# Patient Record
Sex: MALE | Race: WHITE | Employment: FULL TIME | URBAN - METROPOLITAN AREA
[De-identification: names, ages, dates, MRNs, and addresses within clinical notes are randomized per-mention and may not be internally consistent; named-entity substitution may affect disease eponyms.]

---

## 2020-02-12 ENCOUNTER — HOSPITAL ENCOUNTER (OUTPATIENT)
Age: 40
Setting detail: SPECIMEN
Discharge: HOME | End: 2020-02-12
Payer: MEDICAID

## 2020-02-13 LAB
ABSOLUTE EOS #: 0.2 K/UL (ref 0–0.44)
ABSOLUTE IMMATURE GRANULOCYTE: <0.03 K/UL (ref 0–0.3)
ABSOLUTE LYMPH #: 1.58 K/UL (ref 1.1–3.7)
ABSOLUTE MONO #: 0.5 K/UL (ref 0.1–1.2)
ALBUMIN SERPL-MCNC: 3.8 G/DL (ref 3.5–5.2)
ALBUMIN/GLOBULIN RATIO: 1 (ref 1–2.5)
ALP BLD-CCNC: 116 U/L (ref 40–129)
ALT SERPL-CCNC: 45 U/L (ref 5–41)
ANION GAP SERPL CALCULATED.3IONS-SCNC: 16 MMOL/L (ref 9–17)
AST SERPL-CCNC: 24 U/L
BASOPHILS # BLD: 1 % (ref 0–2)
BASOPHILS ABSOLUTE: 0.05 K/UL (ref 0–0.2)
BILIRUB SERPL-MCNC: 0.17 MG/DL (ref 0.3–1.2)
BUN BLDV-MCNC: 28 MG/DL (ref 6–20)
BUN/CREAT BLD: ABNORMAL (ref 9–20)
CALCIUM SERPL-MCNC: 9.7 MG/DL (ref 8.6–10.4)
CHLORIDE BLD-SCNC: 104 MMOL/L (ref 98–107)
CHOLESTEROL, FASTING: 233 MG/DL
CHOLESTEROL/HDL RATIO: 7.3
CO2: 23 MMOL/L (ref 20–31)
CREAT SERPL-MCNC: 0.91 MG/DL (ref 0.7–1.2)
DIFFERENTIAL TYPE: NORMAL
EOSINOPHILS RELATIVE PERCENT: 3 % (ref 1–4)
GFR AFRICAN AMERICAN: >60 ML/MIN
GFR NON-AFRICAN AMERICAN: >60 ML/MIN
GFR SERPL CREATININE-BSD FRML MDRD: ABNORMAL ML/MIN/{1.73_M2}
GFR SERPL CREATININE-BSD FRML MDRD: ABNORMAL ML/MIN/{1.73_M2}
GLUCOSE BLD-MCNC: 87 MG/DL (ref 70–99)
HCT VFR BLD CALC: 41 % (ref 40.7–50.3)
HDLC SERPL-MCNC: 32 MG/DL
HEMOGLOBIN: 13.1 G/DL (ref 13–17)
IMMATURE GRANULOCYTES: 0 %
LDL CHOLESTEROL: 128 MG/DL (ref 0–130)
LYMPHOCYTES # BLD: 24 % (ref 24–43)
MCH RBC QN AUTO: 28.5 PG (ref 25.2–33.5)
MCHC RBC AUTO-ENTMCNC: 32 G/DL (ref 28.4–34.8)
MCV RBC AUTO: 89.1 FL (ref 82.6–102.9)
MONOCYTES # BLD: 8 % (ref 3–12)
NRBC AUTOMATED: 0 PER 100 WBC
PDW BLD-RTO: 13 % (ref 11.8–14.4)
PLATELET # BLD: 295 K/UL (ref 138–453)
PLATELET ESTIMATE: NORMAL
PMV BLD AUTO: 10.3 FL (ref 8.1–13.5)
POTASSIUM SERPL-SCNC: 4.4 MMOL/L (ref 3.7–5.3)
RBC # BLD: 4.6 M/UL (ref 4.21–5.77)
RBC # BLD: NORMAL 10*6/UL
SEG NEUTROPHILS: 64 % (ref 36–65)
SEGMENTED NEUTROPHILS ABSOLUTE COUNT: 4.18 K/UL (ref 1.5–8.1)
SODIUM BLD-SCNC: 143 MMOL/L (ref 135–144)
TOTAL PROTEIN: 7.7 G/DL (ref 6.4–8.3)
TRIGLYCERIDE, FASTING: 365 MG/DL
TSH SERPL DL<=0.05 MIU/L-ACNC: 2.27 MIU/L (ref 0.3–5)
VITAMIN D 25-HYDROXY: 14.2 NG/ML (ref 30–100)
VLDLC SERPL CALC-MCNC: ABNORMAL MG/DL (ref 1–30)
WBC # BLD: 6.5 K/UL (ref 3.5–11.3)
WBC # BLD: NORMAL 10*3/UL

## 2020-03-05 ENCOUNTER — HOSPITAL ENCOUNTER (OUTPATIENT)
Age: 40
Discharge: HOME | End: 2020-03-05
Payer: MEDICAID

## 2020-03-10 ENCOUNTER — HOSPITAL ENCOUNTER (OUTPATIENT)
Age: 40
Discharge: HOME | End: 2020-03-10
Payer: MEDICAID

## 2020-03-10 ENCOUNTER — HOSPITAL ENCOUNTER (OUTPATIENT)
Dept: ULTRASOUND IMAGING | Age: 40
Discharge: HOME | End: 2020-03-12
Payer: MEDICAID

## 2020-03-10 LAB
C DIFF AG + TOXIN: ABNORMAL
SPECIMEN DESCRIPTION: ABNORMAL

## 2020-03-10 PROCEDURE — 76700 US EXAM ABDOM COMPLETE: CPT

## 2020-03-10 PROCEDURE — 87506 IADNA-DNA/RNA PROBE TQ 6-11: CPT

## 2020-03-10 PROCEDURE — 87493 C DIFF AMPLIFIED PROBE: CPT

## 2020-03-10 PROCEDURE — 87324 CLOSTRIDIUM AG IA: CPT

## 2020-03-10 PROCEDURE — 87449 NOS EACH ORGANISM AG IA: CPT

## 2020-03-11 LAB
C DIFFICILE TOXINS, PCR: NORMAL
CAMPYLOBACTER PCR: NORMAL
E COLI ENTEROTOXIGENIC PCR: NORMAL
PLESIOMONAS SHIGELLOIDES PCR: NORMAL
SALMONELLA PCR: NORMAL
SHIGATOXIN GENE PCR: NORMAL
SHIGELLA SP PCR: NORMAL
SPECIMEN DESCRIPTION: NORMAL
SPECIMEN DESCRIPTION: NORMAL
VIBRIO PCR: NORMAL
YERSINIA ENTEROCOLITICA PCR: NORMAL

## 2020-07-28 ENCOUNTER — OFFICE VISIT (OUTPATIENT)
Dept: GASTROENTEROLOGY | Age: 40
End: 2020-07-28
Payer: MEDICAID

## 2020-07-28 ENCOUNTER — TELEPHONE (OUTPATIENT)
Dept: GASTROENTEROLOGY | Age: 40
End: 2020-07-28

## 2020-07-28 VITALS
BODY MASS INDEX: 30.2 KG/M2 | HEIGHT: 66 IN | HEART RATE: 100 BPM | RESPIRATION RATE: 18 BRPM | DIASTOLIC BLOOD PRESSURE: 70 MMHG | SYSTOLIC BLOOD PRESSURE: 109 MMHG | WEIGHT: 187.9 LBS | TEMPERATURE: 97.8 F

## 2020-07-28 PROCEDURE — 1036F TOBACCO NON-USER: CPT | Performed by: INTERNAL MEDICINE

## 2020-07-28 PROCEDURE — G8417 CALC BMI ABV UP PARAM F/U: HCPCS | Performed by: INTERNAL MEDICINE

## 2020-07-28 PROCEDURE — G8427 DOCREV CUR MEDS BY ELIG CLIN: HCPCS | Performed by: INTERNAL MEDICINE

## 2020-07-28 PROCEDURE — 99204 OFFICE O/P NEW MOD 45 MIN: CPT | Performed by: INTERNAL MEDICINE

## 2020-07-28 RX ORDER — LISINOPRIL 5 MG/1
5 TABLET ORAL DAILY
COMMUNITY

## 2020-07-28 RX ORDER — SODIUM, POTASSIUM,MAG SULFATES 17.5-3.13G
SOLUTION, RECONSTITUTED, ORAL ORAL
Qty: 2 BOTTLE | Refills: 0
Start: 2020-07-28

## 2020-07-28 RX ORDER — OMEPRAZOLE 20 MG/1
20 CAPSULE, DELAYED RELEASE ORAL DAILY
COMMUNITY

## 2020-07-28 NOTE — PROGRESS NOTES
Shereen Conway is a 38 y.o. male   YOB: 1980    Blood pressure 109/70, pulse 100, temperature 97.8 °F (36.6 °C), temperature source Temporal, resp. rate 18, height 5' 6\" (1.676 m), weight 187 lb 14.4 oz (85.2 kg). Body mass index is 30.33 kg/m². Mr. Dakotah Arreola is a young man who presents for evaluation regarding diarrhea of a few years duration. He is a vague historian who not infrequently contradicted himself. He states he has been having \"uncontrollable\" diarrhea for the last few years with up to 12-13 stools daily including nocturnal defecation. He says that he gets much worse after taking a Trulicity shot and, if he does not take them, his stools become formed with a frequency of about 4/day. He tells me that he gets other side effects from this medication including lightheadedness, nausea and fatigue but that stopping it is not possible because of his high hemoglobin A1c. He has also taken metformin in the past which also exacerbated his diarrhea in addition to which it caused nausea and abdominal discomfort. He has frequent episodes of nausea and pyrosis with no benefit from PPI treatment although he never took it for more than a few days before deciding that it was unhelpful. He denies melena, hematochezia, hematemesis, dysphagia, odynophagia, weight loss, fever or chills. He has a family history of colon polyps in his mother who had negative genetic testing for hereditary colon cancer which leads me to believe that perhaps she had carcinoma rather than benign polyps. He has diabetes mellitus of 20-21 years duration with known neuropathy and retinopathy. He initially stated that he had seen a gastroenterologist in [facility] who ordered laboratory studies after which he said that might of been an endocrinologist.    According to the chart, Ms. Centeno, his PCP ordered laboratory studies in February and March of this year with the results being white blood count 6500, hemoglobin 13.1, hematocrit 41.0, platelet count 969,347, glucose 87, BUN 28, creatinine 0.91, calcium 9.7, sodium 143, potassium 4.4, chloride 104, bicarbonate 23, alkaline phosphatase 116, ALT 45, AST 24, bilirubin 0.17, albumin 3.8, cholesterol 233, HDL 32, , cholesterol/HDL ratio 7.3, triglyceride 365, TSH 2.27, vitamin D 14.2. C. difficile toxin/antigen was indeterminate but subsequent molecular testing was negative. A GI panel was negative for Campylobacter, Salmonella, Shigella toxin, Shigella, Plesiomonas Shigelloides, vibrio, enterotoxigenic E. coli and Yersinia enterocolitica. The only hemoglobin A1c on the chart is from 2015 at which time was greater than 14. Abdominal ultrasound on 3/10/2020 was unremarkable, status post cholecystectomy.     Past Medical History:   Diagnosis Date    Hypertension     IBS (irritable bowel syndrome)     Type II or unspecified type diabetes mellitus without mention of complication, not stated as uncontrolled         Past Surgical History:   Procedure Laterality Date    APPENDECTOMY      CHOLECYSTECTOMY      HAND SURGERY          Family History   Problem Relation Age of Onset    Breast Cancer Mother     Colon Polyps Mother        Social History     Socioeconomic History    Marital status: Single     Spouse name: Not on file    Number of children: Not on file    Years of education: Not on file    Highest education level: Not on file   Occupational History    Occupation:  none   Social Needs    Financial resource strain: Not on file    Food insecurity     Worry: Not on file     Inability: Not on file   [employer] needs     Medical: Not on file     Non-medical: Not on file   Tobacco Use    Smoking status: Former Smoker    Smokeless tobacco: Never Used   Substance and Sexual Activity    Alcohol use: No     Alcohol/week: 0.0 standard drinks    Drug use: Yes     Comment: marjuana some times    Sexual activity: Yes     Partners: Female   Lifestyle    Physical activity     Days per week: Not on file     Minutes per session: Not on file    Stress: Not on file   Relationships    Social connections     Talks on phone: Not on file     Gets together: Not on file     Attends Sikhism service: Not on file     Active member of club or organization: Not on file     Attends meetings of clubs or organizations: Not on file     Relationship status: Not on file    Intimate partner violence     Fear of current or ex partner: Not on file     Emotionally abused: Not on file     Physically abused: Not on file     Forced sexual activity: Not on file   Other Topics Concern    Not on file   Social History Narrative    Not on file       Outpatient Medications Marked as Taking for the 7/28/20 encounter (Office Visit) with Irene Oconnor MD   Medication Sig Dispense Refill    lisinopril (PRINIVIL;ZESTRIL) 5 MG tablet Take 5 mg by mouth daily      omeprazole (PRILOSEC) 20 MG delayed release capsule Take 20 mg by mouth Daily      Na Sulfate-K Sulfate-Mg Sulf 17.5-3.13-1.6 GM/177ML SOLN Take as directed for colon prep 2 Bottle 0       Allergies as of 07/28/2020    (No Known Allergies)         Review of systems:   General: Completed and, except as mentioned above, was negative or noncontributory  Psychological:  Completed and, except as mentioned above, was negative or noncontributory  Ophthalmic:  Completed and, except as mentioned above, was negative or noncontributory  ENT:  Completed and, except as mentioned above, was negative or noncontributory  Allergy and Immunology:  Completed and, except as mentioned above, was negative or noncontributory  Hematological and Lymphatic:  Completed and, except as mentioned above, was negative or noncontributory  Endocrine: Completed and, except as mentioned above, was negative or noncontributory  Breast:  Completed and, except as mentioned above, was negative or noncontributory  Respiratory:  Completed and, except as mentioned above, was negative or noncontributory  Cardiovascular:  Completed and, except as mentioned above, was negative or noncontributory  Gastrointestinal: See HPI  Genito-Urinary:  Completed and, except as mentioned above, was negative or noncontributory  Musculoskeletal:  Completed and, except as mentioned above, was negative or noncontributory  Neurological:  Completed and, except as mentioned above, was negative or noncontributory  Dermatological:  Completed and, except as mentioned above, was negative or noncontributory      Physical exam:  Constitutional - Well-developed, well-nourished, in no acute distress. Vital signs, height and weight are as documented above. Mental Status /Psychiatric - alert, oriented to person, place, and time, normal mood, behavior, speech, dress, motor activity, and thought processes    Neck - supple, no significant adenopathy, trachea midline, thyroid not enlarged     Respiratory - clear to auscultation, no wheezes, rales or rhonchi, symmetric air entry, no tachypnea, retractions or cyanosis, no evidence of increased respiratory effort     Cardiovascular - normal rate, regular rhythm, normal S1, S2, no murmurs, rubs, clicks or gallops, normal bilateral carotid upstroke without bruits, no JVD     Gastrointestinal - soft, nontender, nondistended, no masses or organomegaly, bowel sounds normal, no hernias noted     Musculoskeletal - no joint tenderness, deformity or swelling, no muscular tenderness noted, normal range of motion     Extremities - peripheral pulses normal, no pedal edema, no clubbing or cyanosis     Skin - normal coloration and turgor, no rashes, no suspicious skin lesions noted    This gentlemen's diarrhea is probably secondary to his Trulicity although we need to rule out microscopic colitis, neoplasm and inflammatory bowel disease. Additionally, he could have diabetic enteropathy given the duration of his disease, poor control and no neuropathy and retinopathy.   His heartburn and nausea

## 2020-07-28 NOTE — TELEPHONE ENCOUNTER
Patient was in office today Colon/EGD scheduled for 10/5/2020 order faxed to surgery prep instructions handed to patient

## 2020-07-28 NOTE — PATIENT INSTRUCTIONS
SURVEY:    You may be receiving a survey from [employer] regarding your visit today. Please complete the survey to enable us to provide the highest quality of care to you and your family. If you cannot score us a very good on any question, please call the office to discuss how we could have made your experience a very good one. Thank you. Patient Education        Irritable Bowel Syndrome: Care Instructions  Your Care Instructions  Irritable bowel syndrome, or IBS, is a problem with the intestines that causes belly pain, bloating, gas, constipation, and diarrhea. The cause of IBS is not well known. IBS can last for many years, but it does not get worse over time or lead to serious disease. Most people can control their symptoms by changing their diet and reducing stress. Follow-up care is a key part of your treatment and safety. Be sure to make and go to all appointments, and call your doctor if you are having problems. It's also a good idea to know your test results and keep a list of the medicines you take. How can you care for yourself at home? · For constipation:  ? Include fruits, vegetables, beans, and whole grains in your diet each day. These foods are high in fiber. ? Drink plenty of fluids. If you have kidney, heart, or liver disease and have to limit fluids, talk with your doctor before you increase the amount of fluids you drink. ? Take a fiber supplement, such as Citrucel or Metamucil, every day if needed. Read and follow all instructions on the label. ? Schedule time each day for a bowel movement. Having a daily routine may help. Take your time and do not strain when having a bowel movement. · If you often have diarrhea, limit foods and drinks that make it worse.  These are different for each person but may include caffeine (found in coffee, tea, chocolate, and cola drinks), alcohol, fatty foods, gas-producing foods (such as beans, cabbage, and broccoli), some dairy products, and spicy foods. Do not eat candy or gum that contains sorbitol. · Keep a daily diary of what you eat and what symptoms you have. This may help find foods that cause you problems. · Eat slowly. Try to make mealtime relaxing. · Find ways to reduce stress. · Get at least 30 minutes of exercise on most days of the week. Exercise can help reduce tension and prevent constipation. Walking is a good choice. You also may want to do other activities, such as running, swimming, cycling, or playing tennis or team sports. When should you call for help? Call your doctor now or seek immediate medical care if:  · Your pain is different than usual or occurs with fever. · You lose weight without trying, or you lose your appetite and you do not know why. · Your symptoms often wake you from sleep. · Your stools are black and tarlike or have streaks of blood. Watch closely for changes in your health, and be sure to contact your doctor if:  · Your IBS symptoms get worse or begin to disrupt your day-to-day life. · You become more tired than usual.  · Your home treatment stops working. Where can you learn more? Go to [email or web address] and sign in to your [employer] account. Enter N185 in the [facility] box to learn more about \"Irritable Bowel Syndrome: Care Instructions. \"     If you do not have an account, please click on the \"Sign Up Now\" link. Current as of: August 12, 2019               Content Version: 12.5  © 6517-9776 [employer]. Care instructions adapted under license by [facility] ([facility]). If you have questions about a medical condition or this instruction, always ask your healthcare professional. [place] [address] any warranty or liability for your use of this information. EGD/Colonoscopy explained and informed consent obtained.

## 2020-09-28 DIAGNOSIS — Z01.818 PREOP TESTING: Primary | ICD-10-CM

## 2020-09-29 NOTE — TELEPHONE ENCOUNTER
Patient cancelled procedure. Notified Surgery. He would also like this forwarded to the General Surgery Dept.